# Patient Record
Sex: FEMALE | Race: AMERICAN INDIAN OR ALASKA NATIVE | NOT HISPANIC OR LATINO
[De-identification: names, ages, dates, MRNs, and addresses within clinical notes are randomized per-mention and may not be internally consistent; named-entity substitution may affect disease eponyms.]

---

## 2019-10-01 PROBLEM — Z00.00 ENCOUNTER FOR PREVENTIVE HEALTH EXAMINATION: Status: ACTIVE | Noted: 2019-10-01

## 2019-10-04 ENCOUNTER — APPOINTMENT (OUTPATIENT)
Dept: COLORECTAL SURGERY | Facility: CLINIC | Age: 54
End: 2019-10-04

## 2023-10-11 ENCOUNTER — EMERGENCY (EMERGENCY)
Facility: HOSPITAL | Age: 58
LOS: 1 days | Discharge: HOME | End: 2023-10-11
Attending: EMERGENCY MEDICINE
Payer: MEDICAID

## 2023-10-11 VITALS
RESPIRATION RATE: 18 BRPM | DIASTOLIC BLOOD PRESSURE: 70 MMHG | SYSTOLIC BLOOD PRESSURE: 108 MMHG | HEART RATE: 55 BPM | TEMPERATURE: 98 F | OXYGEN SATURATION: 95 %

## 2023-10-11 VITALS
WEIGHT: 179.9 LBS | HEIGHT: 67 IN | RESPIRATION RATE: 18 BRPM | SYSTOLIC BLOOD PRESSURE: 121 MMHG | HEART RATE: 55 BPM | DIASTOLIC BLOOD PRESSURE: 72 MMHG | TEMPERATURE: 98 F | OXYGEN SATURATION: 99 %

## 2023-10-11 PROCEDURE — 99283 EMERGENCY DEPT VISIT LOW MDM: CPT | Mod: 25

## 2023-10-11 PROCEDURE — 99284 EMERGENCY DEPT VISIT MOD MDM: CPT

## 2023-10-11 NOTE — ED PROVIDER NOTE - CLINICAL SUMMARY MEDICAL DECISION MAKING FREE TEXT BOX
Patient presenting with wrist pain, no evidence of fracture, likely sprain/strain, no indication for imaging based off exam and mechanism, placed in splint for comfort, will discharge with ortho follow up.

## 2023-10-11 NOTE — ED PROVIDER NOTE - OBJECTIVE STATEMENT
57-year-old woman presenting complaining of right forearm pain after lifting a heavy item.  No other specific injuries noted.  No loss of sensation.  Is taking NSAIDs at home with some relief of pain.  No loss of sensation.

## 2023-10-11 NOTE — ED PROVIDER NOTE - PATIENT PORTAL LINK FT
You can access the FollowMyHealth Patient Portal offered by [facility] by registering at the following website: [email or web address]. By joining "[employer]"’s FollowMyHealth portal, you will also be able to view your health information using other applications (apps) compatible with our system.

## 2023-10-11 NOTE — ED ADULT TRIAGE NOTE - CHIEF COMPLAINT QUOTE
Pt c/o right lower arm pain going to right wrist, pt stated she lifted something heavy with her right hand, yesterday

## 2023-10-11 NOTE — ED PROVIDER NOTE - NSFOLLOWUPINSTRUCTIONS_ED_ALL_ED_FT
Thank you for choosing [facility] for your healthcare.    You were seen in the Emergency Department for a wrist injury which is likely a sprain.  There is no evidence of a broken bone on our exam or by your mechanism of injuring yourself.  We have placed your wrist into a splint to help support it at home.  You can continue taking anti-inflammatory medications and Tylenol over-the-counter as directed on the packaging for pain.  You can also ice to the painful area.  We are including the information for our orthopedists offices we can follow-up with if the pain does not begin getting better in the next few days.  Please return to the emergency room for any further emergent or concerning medical issues.

## 2023-10-11 NOTE — ED PROVIDER NOTE - NSFOLLOWUPCLINICS_GEN_ALL_ED_FT
[facility]  Orthopedics  [address] [place] [address]  Phone: [phone]  Fax: [phone]

## 2024-07-16 NOTE — ED PROVIDER NOTE - PHYSICAL EXAMINATION
Labs reviewed patient in today's visit.  See chart note.  TSH 5.14 due to patient skipping doses of her levothyroxine.  Patient advised to be more compliant and continue with same dose.  Check repeat next visit.  Chemistry panel showing mild renal insufficiency creatinine 1.07, GFR 57.  Glucose 114.  CBC shows slight improvement in hemoglobin 12.1.  Though increase in .  Previous B12 level normal.  Patient advised to supplement with OTC B complex and folic acid and check CBC in the future Exam:  General: Patient well appearing, vital signs within normal limits  HEENT: airway patent with moist mucous membranes  Cardiac: RRR with strong peripheral pulses  MSK: no deformity, no edema  Neuro: no gross neurologic deficits  Skin: warm, well perfused  Psych: normal mood and affect